# Patient Record
Sex: MALE | Race: WHITE | NOT HISPANIC OR LATINO
[De-identification: names, ages, dates, MRNs, and addresses within clinical notes are randomized per-mention and may not be internally consistent; named-entity substitution may affect disease eponyms.]

---

## 2021-04-01 ENCOUNTER — TRANSCRIBE ORDERS (OUTPATIENT)
Dept: OTHER | Age: 76
End: 2021-04-01

## 2021-04-01 DIAGNOSIS — S06.0X9D CONCUSSION WITH LOSS OF CONSCIOUSNESS, SUBSEQUENT ENCOUNTER: Primary | ICD-10-CM

## 2021-04-19 ENCOUNTER — HOSPITAL ENCOUNTER (OUTPATIENT)
Dept: NEUROLOGY | Facility: CLINIC | Age: 76
Setting detail: THERAPIES SERIES
Discharge: STILL ADMITTED OR ADMINISTRATIVE | End: 2021-04-19
Attending: NURSE PRACTITIONER

## 2021-04-19 DIAGNOSIS — F07.81 POST CONCUSSION SYNDROME: ICD-10-CM

## 2021-04-19 DIAGNOSIS — S06.9XAS MOOD DISORDER AS LATE EFFECT OF TRAUMATIC BRAIN INJURY (H): ICD-10-CM

## 2021-04-19 DIAGNOSIS — F90.9 ATTENTION DEFICIT HYPERACTIVITY DISORDER (ADHD), UNSPECIFIED ADHD TYPE: ICD-10-CM

## 2021-04-19 DIAGNOSIS — F06.30 MOOD DISORDER AS LATE EFFECT OF TRAUMATIC BRAIN INJURY (H): ICD-10-CM

## 2021-04-19 DIAGNOSIS — G47.00 INSOMNIA, UNSPECIFIED TYPE: ICD-10-CM

## 2021-04-19 DIAGNOSIS — G44.309 POST-CONCUSSION HEADACHE: ICD-10-CM

## 2021-04-19 RX ORDER — LOSARTAN POTASSIUM 25 MG/1
25 TABLET ORAL
Status: SHIPPED | COMMUNITY
Start: 2021-03-31

## 2021-04-19 RX ORDER — ERGOCALCIFEROL 1.25 MG/1
50000 CAPSULE ORAL
Status: SHIPPED | COMMUNITY
Start: 2019-10-21

## 2021-04-19 RX ORDER — ISOSORBIDE MONONITRATE 30 MG/1
30 TABLET, EXTENDED RELEASE ORAL
Status: SHIPPED | COMMUNITY
Start: 2021-04-16

## 2021-04-19 RX ORDER — CYANOCOBALAMIN 1000 UG/ML
1000 INJECTION, SOLUTION INTRAMUSCULAR; SUBCUTANEOUS
Status: SHIPPED | COMMUNITY
Start: 2020-01-01

## 2021-04-19 RX ORDER — ASPIRIN 81 MG/1
81 TABLET, CHEWABLE ORAL
Status: SHIPPED | COMMUNITY
Start: 2020-11-03

## 2021-04-19 RX ORDER — CLOPIDOGREL BISULFATE 75 MG/1
75 TABLET ORAL
Status: SHIPPED | COMMUNITY
Start: 2021-03-31

## 2021-04-19 RX ORDER — AMANTADINE HYDROCHLORIDE 100 MG/1
TABLET ORAL
Status: SHIPPED | COMMUNITY
Start: 2020-08-04

## 2021-04-19 RX ORDER — METOPROLOL TARTRATE 50 MG
50 TABLET ORAL
Status: SHIPPED | COMMUNITY
Start: 2021-03-31

## 2021-04-19 RX ORDER — LISINOPRIL 5 MG/1
5 TABLET ORAL
Status: SHIPPED | COMMUNITY
Start: 2021-04-19

## 2021-04-19 RX ORDER — PRAMIPEXOLE DIHYDROCHLORIDE 0.25 MG/1
0.5 TABLET ORAL
Status: SHIPPED | COMMUNITY
Start: 2021-03-31

## 2021-05-10 ENCOUNTER — OFFICE VISIT - HEALTHEAST (OUTPATIENT)
Dept: PHYSICAL THERAPY | Facility: REHABILITATION | Age: 76
End: 2021-05-10

## 2021-05-10 DIAGNOSIS — R53.82 CHRONIC FATIGUE: ICD-10-CM

## 2021-05-10 DIAGNOSIS — F07.81 POST CONCUSSION SYNDROME: ICD-10-CM

## 2021-05-10 DIAGNOSIS — R26.89 IMPAIRMENT OF BALANCE: ICD-10-CM

## 2021-05-10 DIAGNOSIS — R26.81 GAIT INSTABILITY: ICD-10-CM

## 2021-05-10 DIAGNOSIS — G44.309 POST-CONCUSSION HEADACHE: ICD-10-CM

## 2021-05-26 ENCOUNTER — RECORDS - HEALTHEAST (OUTPATIENT)
Dept: ADMINISTRATIVE | Facility: CLINIC | Age: 76
End: 2021-05-26

## 2021-05-27 ENCOUNTER — RECORDS - HEALTHEAST (OUTPATIENT)
Dept: ADMINISTRATIVE | Facility: CLINIC | Age: 76
End: 2021-05-27

## 2021-05-28 ENCOUNTER — COMMUNICATION - HEALTHEAST (OUTPATIENT)
Dept: PHYSICAL THERAPY | Facility: REHABILITATION | Age: 76
End: 2021-05-28

## 2021-05-28 ENCOUNTER — RECORDS - HEALTHEAST (OUTPATIENT)
Dept: ADMINISTRATIVE | Facility: CLINIC | Age: 76
End: 2021-05-28

## 2021-05-30 ENCOUNTER — RECORDS - HEALTHEAST (OUTPATIENT)
Dept: ADMINISTRATIVE | Facility: CLINIC | Age: 76
End: 2021-05-30

## 2021-06-01 ENCOUNTER — RECORDS - HEALTHEAST (OUTPATIENT)
Dept: ADMINISTRATIVE | Facility: CLINIC | Age: 76
End: 2021-06-01

## 2021-06-02 ENCOUNTER — OFFICE VISIT - HEALTHEAST (OUTPATIENT)
Dept: PHYSICAL THERAPY | Facility: REHABILITATION | Age: 76
End: 2021-06-02

## 2021-06-02 DIAGNOSIS — R26.89 IMPAIRMENT OF BALANCE: ICD-10-CM

## 2021-06-02 DIAGNOSIS — R26.81 GAIT INSTABILITY: ICD-10-CM

## 2021-06-02 DIAGNOSIS — R53.82 CHRONIC FATIGUE: ICD-10-CM

## 2021-06-03 ENCOUNTER — RECORDS - HEALTHEAST (OUTPATIENT)
Dept: ADMINISTRATIVE | Facility: CLINIC | Age: 76
End: 2021-06-03

## 2021-06-05 ENCOUNTER — RECORDS - HEALTHEAST (OUTPATIENT)
Dept: SCHEDULING | Facility: CLINIC | Age: 76
End: 2021-06-05

## 2021-06-05 DIAGNOSIS — R06.02 SHORTNESS OF BREATH: ICD-10-CM

## 2021-06-09 ENCOUNTER — RECORDS - HEALTHEAST (OUTPATIENT)
Dept: ADMINISTRATIVE | Facility: CLINIC | Age: 76
End: 2021-06-09

## 2021-06-14 ENCOUNTER — COMMUNICATION - HEALTHEAST (OUTPATIENT)
Dept: NEUROLOGY | Facility: CLINIC | Age: 76
End: 2021-06-14

## 2021-06-14 DIAGNOSIS — F06.30 MOOD DISORDER AS LATE EFFECT OF TRAUMATIC BRAIN INJURY (H): ICD-10-CM

## 2021-06-14 DIAGNOSIS — G47.00 INSOMNIA, UNSPECIFIED TYPE: ICD-10-CM

## 2021-06-14 DIAGNOSIS — S06.9XAS MOOD DISORDER AS LATE EFFECT OF TRAUMATIC BRAIN INJURY (H): ICD-10-CM

## 2021-06-14 DIAGNOSIS — F90.9 ATTENTION DEFICIT HYPERACTIVITY DISORDER (ADHD), UNSPECIFIED ADHD TYPE: ICD-10-CM

## 2021-06-14 RX ORDER — METHYLPHENIDATE HYDROCHLORIDE 5 MG/1
5 TABLET ORAL 2 TIMES DAILY
Qty: 60 TABLET | Refills: 0 | Status: SHIPPED | OUTPATIENT
Start: 2021-06-14

## 2021-06-16 ENCOUNTER — COMMUNICATION - HEALTHEAST (OUTPATIENT)
Dept: PHYSICAL THERAPY | Facility: REHABILITATION | Age: 76
End: 2021-06-16

## 2021-06-16 NOTE — PROGRESS NOTES
"Telephone Visit  Alvaro Thrasher is a 76 y.o. male who is being evaluated via a billable video visit in light of the ongoing global health crisis (COVID-19) that requires us to abide by social distancing mandates in order to reduce the risk of COVID-19 exposure.      The patient has been notified of following:     \"This telephone visit will be conducted via a call between you and your physician/provider. We have found that certain health care needs can be provided without the need for a physical exam.  This service lets us provide the care you need with a short phone conversation.  If a prescription is necessary we can send it directly to your pharmacy.  If lab work is needed we can place an order for that and you can then stop by our lab to have the test done at a later time.    If during the course of the call the physician/provider feels a telephone visit is not appropriate, you will not be charged for this service.\"     Patient has given verbal consent to a Telephone visit? Yes    Alvaro Thrasher chief complaint is: Post Concussion Syndrome    Consent was obtained for this service by one of our care team members    ALLERGIES  Patient has no known allergies.    Current PT  No      Current OT  No      Current ST  No       Current Chiropractic  No  Psychiatrist currently No  Past:  No  Psychologist currently No  Past:  Yes  Primary: Currently Yes                     MRI/CT Completed  Yes     Need a note for work accommodations  Yes    Need a note for school accommodations  No         Medications  Currently on medication to help you sleep  Yes   Mirapex   Mental health dx.- Depression   Currently on medication to help with mental health Yes     venlafaxine   Currently on medication for concentration or ADD /ADHD     No         Are you on a controlled substance  No     Date of accident: 2018  Workman's Comp   No     How concussion happened:     \"Onset symptoms fell backward and on right side of head from chair striking " "head on concrete wall, dazed, ~ an hr laid down on couch and when he got up he got dizzy and nauseates, next day vomited and has ongoing nausea and dizziness and headache since. Balance off.\"         LOC:  Yes    Did you seek medical attention:  Yes       MRI/CT Completed  Yes       Injury Description:               Was there a forcible blow to the head?:                Yes     Where on head? Right side of head                                             Retrograde Amnesia (loss of memory of events before the injury)?:  No  Anterograde Amnesia (loss of memory of events following injury)?:  Yes    Number of previous head injuries.        0    Work/School  Currently employed     Yes    Are you considered a essential employee?     Yes  Are you working from home or in office office   Retired    No     Disability  No   Title   Owner of a [employer]         Normal hours per week  (Average before injury) 30        Have your returned to work?            Yes    Full hours:     Yes    Hours working a week currently  30  Any days off after concussion?                                Yes    He denies any developmental problems, learning disabilities, or history of ADHD.     Patient History  Patient was referred to the concussion clinic by Lucía Ayon [facility] Dr. Dale Lentz.     Phone Start Time: 10:50 am    Phone End Time:  11:05 am    Total time for phone call 15 minutes     Phone number Patient would like to use: [phone]    [facility]       Plan:     Neuropsychological assessment   No   PT to evaluate and treat  Yes  OT to evaluate and treat  No  ST to evaluate and treat  No  Referral to ophthalmology   No  Referral to Neurology        No  Referral to psychology No  Referral to psychiatry  No  Other Referral   No  MRI/CT ordered today : No  Labs ordered today : No  New medication :  Yes  Amitriptyline and Wellbutrin  Work note completed : No  School note completed : No  [facility] list " "sent : No    Discussed: RED FLAGS NEEDING ACUTE EMERGENCY MANAGEMENT:                          Headaches that worsen                          Seizures                          Focal Neurologic Signs                          Drowsiness/Lethargy                          Repeated vomiting                          Slurred Speech                          Inability to recognize people/places                          Increasing confusion/irritability                          Weakness/numbness                          Neck pain                          Unusual behavioral change                          Change in level of consciousness    Subjective:          HPI    Per medical records..\"Onset symptoms fell backward and on right side of head from chair striking head on concrete wall, dazed, ~ an hr laid down on couch and when he got up he got dizzy and nauseates, next day vomited and has ongoing nausea and dizziness and headache since. Balance off.\"  .  Headaches:  Significant ongoing headaches Yes  Headaches: Intermittently  Improvement :Yes   Current Headache Yes   Wake with HA  Yes     Worse Headache    9/10           How often: once every couple of weeks    Average Headache 7/10.    Best Headache 2/10.  Brings on HA:   He is not sure  Makes symptoms worse  fatigue  Makes symptoms better. rest  Taking  acetaminophen (Tylenol) and aspirin        Helpful:  Yes     Physical Symptoms:  Headache-Yes      Resolved No           Improved since accident Improved     Nausea- Yes      Resolved No        Improved since accident    Improved     Vomiting - Yes       Resolved No         Improved since accident Improved     Balance problems - Yes       Resolved No Improved since accident Same     Dizziness - Yes      Resolved No          Improved since accident Same   Visual problems - Yes,  double      Resolved Yes            Fatigue - Yes     Resolved No           Improved since accident Same    Sensitivity to light - Yes     Resolved " No         Improved since accident Same    Sensitivity to sound - Yes      Resolved No        Improved since accident Same    Numbness/tingling - No        Cognitive Symptoms  Feeling mentally foggy - Yes         Resolved No       Improved since accident Same    Feeling slowed down - Yes         Resolved No         Improved since accident Same    Difficulty Concentrating- Yes        Resolved   No     Improved since accident Same    Difficulty remembering - Yes         Resolved No       Improved since accident Same      Emotional Symptoms  Irritability - Yes        Resolved No         Improved since accident Same    Sadness-   Yes       Resolved No        Improved since accident Same    More emotional - Yes       Resolved No       Improved since accident Same    Nervousness/anxiety - Yes       Resolved No        Improved since accident Same      Psychiatric History:  Anxiety - No  Depression - Yes  Other mental health dx:  No    Sleep Disorders - Yes, FRAN, he is compliant with CPAP  The patient denies being a victim of abuse.    Ever Hospitalized for mental health:             No  Any thought of hurting self or others now?   No  Any history of hurting self or others?            No    Sleep History:  Drowsiness- Yes         Resolved No        Improved since accident Same    Sleep less than usual - Yes  Sleep more than usual - No  Trouble falling asleep - Yes       Resolved No        Improved since accident Same    Does the patient wake feeling rested - No       Resolved No         Improved since accident Same      Migraine Headaches      Patient history of migraines.    No      Family history of migraines    No    Exertion:         Do the above stated symptoms worsen with physical activity? Yes        Do the above stated symptoms worsen with cognitive activity? No          Patient Active Problem List    Diagnosis Date Noted     Benign Prostatic Hypertrophy      Seeing Double (Diplopia)      Hyperlipidemia       Abducens Nerve Injury      Essential Hypertension      Coronary Artery Disease      Type 2 Diabetes Mellitus - Uncomplicated, Controlled      Back Strain      Pelvic Pain      No past medical history on file.  No past surgical history on file.  No family history on file.  Current Outpatient Medications   Medication Sig Dispense Refill     atorvastatin (LIPITOR) 40 MG tablet TAKE 1 TABLET BY MOUTH EVERY DAY 90 tablet 0     glipiZIDE (GLUCOTROL) 5 MG tablet Take 1 tablet (5 mg total) by mouth 2 (two) times a day. 180 tablet 0     metFORMIN (GLUCOPHAGE) 500 MG tablet TAKE 2 TABLETS BY MOUTH EVERY MORNING AND EVERY EVENING 360 tablet 0     NITROSTAT 0.4 mg SL tablet DISSOLVE 1 TABLET UNDER THE TONGUE EVERY 5 MINUTES IF NEEDED FOR CHEST PAIN(FIRST CHOICE FOR CHEST PAIN) 25 tablet 0     venlafaxine (EFFEXOR-XR) 75 MG 24 hr capsule TAKE 1 CAPSULE BY MOUTH TWICE DAILY 60 capsule 0     venlafaxine (EFFEXOR-XR) 75 MG 24 hr capsule TAKE ONE CAPSULE BY MOUTH TWICE DAILY 60 capsule 0     No current facility-administered medications for this encounter.        Social History     Socioeconomic History     Marital status:      Spouse name: Not on file     Number of children: Not on file     Years of education: Not on file     Highest education level: Not on file   Occupational History     Not on file   Social Needs     Financial resource strain: Not on file     Food insecurity     Worry: Not on file     Inability: Not on file     Transportation needs     Medical: Not on file     Non-medical: Not on file   Tobacco Use     Smoking status: Not on file   Substance and Sexual Activity     Alcohol use: Not on file     Drug use: Not on file     Sexual activity: Not on file   Lifestyle     Physical activity     Days per week: Not on file     Minutes per session: Not on file     Stress: Not on file   Relationships     Social connections     Talks on phone: Not on file     Gets together: Not on file     Attends [employer] service: Not  on file     Active member of club or organization: Not on file     Attends meetings of clubs or organizations: Not on file     Relationship status: Not on file     Intimate partner violence     Fear of current or ex partner: Not on file     Emotionally abused: Not on file     Physically abused: Not on file     Forced sexual activity: Not on file   Other Topics Concern     Not on file   Social History Narrative     Not on file       The following portions of the patient's history were reviewed and updated as appropriate: allergies, current medications, past family history, past medical history, past social history, past surgical history and problem list.    Review of Systems  A comprehensive review of systems was negative except for what is noted above.    Objective:       Discussion was held with the patient today regarding concussion in general including types of injury, symptoms that are common, treatment and variability in time to recover. Education about concussion symptoms and length of time it would take the patient to recover was also given to the patient.  I have reassured the patient his symptoms are very common when a concussion is present and will improve with time. We discussed the risks and benefits of possible medication including risk of worsening depression with medication adjustments and even the possibility of emergence of suicidal ideations.       Total time spent with the patient today was 90 minutes with greater than 50% of the time spent in counseling and care coordination. The patient agrees to call with any questions, concerns or problems. We will assess for the appropriateness of possible psychotropic medication trials/changes. The patient will seek out appropriate emergency services should that become necessary    Physical Exam:   Neck:  Full ROM  Yes with pain or stiffness Yes    Neurologic:   Mental status: Alert, oriented, thought content appropriate.. Recent and remote memory grossly  intact.  Yes  Speech is clear and fluent with no obvious word finding or paraphasic errors. Yes    Diagnosis managed and treated at today's visit :  Post concussion syndrome  Post concussion headache  Nausea  Dizziness  Fatigue  Insomnia  Sensitivity to light  Sound sensitivity  Concentration and Attention deficit  Memory difficulties  Anxiety d/t a medical condition  Irritability  Return to work     Plan:  Medication Adjustment:  Amitriptyline  25 mg, take 1-2 tabs PO every HS  Ritalin 5 mg, take one tab PO two times a day    Other:   Patient will return to clinic in 3 weeks. They agree to call or return sooner with any questions or concerns.  Risks and benefits were discussed.  Continue with individual therapist if established     Continue with the support of the clinic, reassurance, and redirection. Staff monitoring and ongoing assessments per team plan.. This team will utilize appropriate emergency services if necessary. I will make myself available if concerns or problems arise.     Mental Status Examination    He is cooperative with questioning. He is fully engaged in conversation today. He is alert and fully oriented. Speech is normal. Thought processes normal with normal prehension and expression. Thoughts are organized and linear. Content is pertinent to the conversation and without evidence of auditory or visual hallucinations. No delusional ideation. Gen. fund of knowledge, insight and memory are normal     Consent was obtained for this service by one of our care team members    Telephone Visit Details    Type of service: Telephone Visit    Phone Start Time: 1100    Phone End Time:  1220    Total time for phone call: 80    Originating Location: Patient's home    Distant Location:  [facility]    Mode of Communication: Telephone call        10 minutes spent on the date of the encounter doing chart review and review of outside records     Patient Instructions   It was nice  "speaking with you today for our office visit held over the phone The following is a summary of our visit and my recommendations:    How to return to daily activities with concussion:  1. Get lots of rest. Be sure to get enough sleep at night- no late nights. Keep the same bedtime weekdays and weekends.   2. Take daytime naps or rest breaks when you feel tired or fatigued.  3. Limit physical activity as well as activities that require a lot of thinking or concentration. These activities can make symptoms worse and recovery time longer. In some cases, your doctor may prescribe time that you completely eliminate these activities to allow complete \"brain rest.\"  Physical activity includes going to the gym, sports practices, weight-training, running, exercising, heavy lifting, etc.  Thinking and concentration activities (e.g., cell phone texting, computer games, movies, parties, loud music and in severe cases may include limiting your time at work).  4. Drink lots of fluids and eat carbohydrates or protein to main appropriate blood sugar levels.  5. As symptoms decrease, with consent from your doctor, you may begin to gradually return to your daily activities. If symptoms worsen or return, lessen your activities, then try again to increase your activities gradually.   6. During recovery, it is normal to feel frustrated and sad when you do not feel right and you can t be as active as usual.  7. Repeated evaluation of your symptoms is recommended to help guide recovery. Please follow up as recommended by your doctor to ensure a safe and healthy recovery.    Watch for and go to the Emergency Department if you have any of the following symptoms:  Headaches that significantly worsen  Looks very drowsy or can t be awakened  Can t recognize people or places  Worsening neck pain  Seizures  Repeated vomiting  Increasing confusion or irritability  Unusual behavioral change  Slurred speech  Weakness or numbness in " arms/legs  Change in state of consciousness    For more information, please visit on the Internet:  http://www.cdc.gov/concussion/get_help.html   http://www.cdc.gov/concussion/pdf/Facts_about_Concussion_TBI-a.pdf    General Information:  Today you had your appointment with Nimco Don CNP     If lab work was done today as part of your evaluation you will generally be contacted via My Chart, mail, or phone with the results within 1-5 days. If there is an alarming result we will contact you by phone. Lab results come back at varying times, I generally wait until all labs are resulted before making comments on results. Please note labs are automatically released to My Chart once available.     If you need refills please contact your pharmacist. They will send a refill request to me to review. Please allow 3 business days for us to process all refill requests.     Please call or send a medical message through My Chart, with any questions or concerns    If you need any paperwork completed please fax forms to [phone]. Please state if you would like a copy of the completed paperwork, mailed or faxed back to the patient and a fax number to fax the paperwork to. Please allow up to 10 days for paperwork to be completed.    Nimco Don CNP

## 2021-06-17 NOTE — PROGRESS NOTES
[facility]   Concussion Initial Evaluation    Patient Name: Alvaro Thrasher  Date of evaluation: 5/11/2021  Referral Diagnosis:s/p Concussion  Referring provider: Nimco Don FNP  Visit Diagnosis:     ICD-10-CM    1. Impairment of balance  R26.89    2. Post concussion syndrome  F07.81 PT eval and treat   3. Post-concussion headache  G44.309 PT eval and treat   4. Gait instability  R26.81    5. Chronic fatigue  R53.82        Assessment:   Alvaro Thrasher is a 76 y.o. male who presents to therapy today with chief complaints of poor fatigue and worsening gait stability/balance. The pt's symptoms began after he sustained a concussion in May 2018 and while he initially had physical therapy that he feels improved his symptoms, he feels he has gone backwards.  The pt reports minimal stamina and an increase in falls, making walking for exercise difficult.  The PT eval revealed balance deficits and the vestibular system was cleared.  General LE weakness and poor activity tolerance appear to be contributing to his symptoms and a strengthening and conditioning program would benefit the pt.  The pt is appropriate for skilled 1:1 PT to address his physical and functional impairments.       Goals:  Pt. will demonstrate/verbalize independence in self-management of condition in : 12 weeks  Pt. will be independent with home exercise program in : 12 weeks  Pt. will be able to walk : 30 minutes;with less difficulty;for community mobility;for exercise/recreation;in 12 weeks (to demo improved fatigue)    Pt will: score 23/30 on the FGA to demo improved gait stability and be a lesser fall risk in 12 weeks      Patient's expectations/goals are realistic.    Barriers to Learning or Achieving Goals:  Chronicity of the problem.  Mental illness or emotional factors.  Pt admits to depression, anxiety       Plan / Patient Instructions:        Plan of Care:   Authorization / Certification Start Date: 05/10/21  Authorization  "/ Certification End Date: 08/08/21  Authorization / Certification Number of Visits: 12  Communication with: Referral Source  Patient Related Instruction: Nature of Condition;Treatment plan and rationale;Self Care instruction;Basis of treatment;Body mechanics;Posture;Precautions;Next steps;Expected outcome  Times per Week: 1-2  Number of Weeks: 6-12  Number of Visits: 12  Discharge Planning: When pt meets PT goals or when pt progress plateaus or when pt is independent with his HEP for ongoing symptom management  Precautions / Restrictions : None  Therapeutic Exercise: ROM;Stretching;Strengthening  Neuromuscular Reeducation: posture;balance/proprioception;core;vestibular  Manual Therapy: soft tissue mobilization;myofascial release;joint mobilization;muscle energy  Gait Training: GT      Plan for next visit: Complete FGA      Subjective:       Onset Date:  May 23, 2018    Mechanism of Injury:  Pt confirms the following.  He also notes he was pushing back to get up from his chair and it come off of a drop off and the pt fell backwards striking his head. Pt did have a bout of Physical Therapy after his injury for his balance which he found to be very helpful.    From EMR (4/19/21): \"Onset symptoms fell backward and on right side of head from chair striking head on concrete wall, dazed, ~ an hr laid down on couch and when he got up he got dizzy and nauseates, next day vomited and has ongoing nausea and dizziness and headache since. 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  Pt \"works, comes home, and goes to bed\".  Pt is totally exhausted upon getting home.    Living situation:  Lives with his wife, Reed (who is present at [facility]).  Pt and spouse just moved to a 3 level condo.  There's about 7 steps from garage to the main level and then bed room is another level up.  Pt reports that " "stairs are tiring for him.    Ongoing symptoms:  \"I don't have my balance\" and fatigue.      Headaches: No complaints        Neck Pain: No complaints     Dizziness:   -Frequency:  Intermittent, but daily   -Description of dizziness without using the word \"dizzy\":  \"Like it's a wave that comes over me\"   -Exacerbating factors:  Bending, lifting items out of the back of his car   -Relieving factors:  Rest   -Dizziness history:  None prior to his concussion in 2018   -Current:  0/10, Best:  0/10, Worst:  10/10    Balance:   -When do you lose balance?:  Bending over, walking on uneven terrain, stepping onto a curb (has fallen due to not lifting foot high enough)   -Recent falls:  Pt feels like he falls like almost 1-2x/wk.   -Does not use a SEC or FWW (but does own both).  Pt feels like he has unsteady gait         Objective:      Note: Items left blank indicates the item was not performed or not indicated at the time of the evaluation.    Patient Outcome Measures :    None completed at PT eval    Cervical Thoracic Examination  1. Impairment of balance     2. Post concussion syndrome  PT eval and treat   3. Post-concussion headache  PT eval and treat   4. Gait instability     5. Chronic fatigue       Precautions/Restrictions: None  Involved side: Bilateral    Cervical ROM: Limited with extension and B sidebend consistent with degenerative changes.  The pt denies any pain but notes general stiffness.  Vertebral Basilar Artery: WNL  Cervical and Thoracic Segmental Mobility/Other: NT     Postural Assessment: Slumped, forward head     Vestibular/Balance    Gait: Unsteady with WBOS, reduced B heel strike, reduced stride length    B LE Screen: MMT is grossly 4/5 to 4-/5, B'ly     Oculomotor  Ocular AROM: WNL  Smooth Pursuit: WNL  Saccades: WNL  Convergence: 9 cm     Positional Tests: Patient educated on positional testing technique and purpose of recreating symptoms - patient consented. Vestibular semi-circular canal " assessment withOUT goggles:   R Julianne-Hallpike: Negative; no c/o dizziness  L Julianne--Hallpike: Negative; no c/o dizziness    Vestibular  VOR Cancellation: Normal  Slow VOR: Normal   Right Head Impulse Test: No c/o dizziness    Left Head Impulse Test: No c/o dizziness     Sensory Organization Tests:  MCTSIB:   NSEO 30 sec, mild sway  NSEC 30 sec, mod-max sway  PSEO 30 sec, max sway but no LOB  PSEC 7.5 sec     Functional Gait Assessment:  NT due to time constraints      Treatment Today:  TREATMENT MINUTES COMMENTS   Evaluation 30 Low Complexity Eval  Patient educated on pathology  Discussed POC   Self-care/ Home management 15   Education Concussion: Patient was educated on the following:  - self-management of symptoms, the importance of relative rest  (rest as needed) instead of strict rest, the benefits of progressive re-engagement in activities, the importance of sleep, safe  - [id number] pacing strategies, and potential signs and symptoms of the need for follow-up care with a physician, physical therapist, or other health care providers.  - about the various symptoms, impairments, and functional limitations that are associated with concussion, and stress that most patients with concussionrecover relatively quickly.   - most people recover well and typically do not have significant difficulties that last more than 1 to 3 months post injury     Manual therapy     Neuromuscular Re-education 10 Standing balance in corner:  -Romberg position with EO and EC  -Semi Tandem position with EO, progressively reducing space between feet; progressing towards EC   Therapeutic Activity     Therapeutic Exercises  Demo/performance of HEP, education on purpose of exercise. Handouts with written instruction provided.   Gait training     Modality__________________                Total 55    Blank areas are intentional and mean the treatment did not include these items.         PT Evaluation Code: (Please list factors)  Patient  History/Comorbidities: see PMH.  Examination: see above, 4+ elements  Clinical Presentation: stable  Clinical Decision Making: low    Patient History/  Comorbidities Examination  (body structures and functions, activity limitations, and/or participation restrictions) Clinical Presentation Clinical Decision Making (Complexity)   No documented Comorbidities or personal factors 1-2 Elements Stable and/or uncomplicated Low   1-2 documented comorbidities or personal factor 3 Elements Evolving clinical presentation with changing characteristics Moderate   3-4 documented comorbidities or personal factors 4 or more Unstable and unpredictable High            Katelyn Vaughan, PT, DPT  5/11/2021  3:10 PM

## 2021-06-25 NOTE — TELEPHONE ENCOUNTER
Called and left message for patient regarding missed appointment on 6/16 at 10am. Reminded of no show policy and that this is NS #2. Reminded of upcoming appointment.    Kaylah Ramon, PT, DPT

## 2021-06-25 NOTE — TELEPHONE ENCOUNTER
Called and spoke to patient regarding missed appointment on 5/28 at 9:30 am. Patient reports that he forgot to write it down, but also that he is not feeling good today. Patient reports chest pain. Patient was instructed of red flags with chest pain and when to go in if needed. Patient also reminded of his upcoming appointments.    Kaylah Chavez, PT, DPT

## 2021-06-25 NOTE — PROGRESS NOTES
[facility] Daily Progress Note    Patient Name: Alvaro Thrasher  Date: 6/2/2021  Visit #: 2/12  PTA visit #:  NA  Referral Diagnosis: Post concussion syndrome  Referring provider: Nimco Don FNP  Visit Diagnosis:     ICD-10-CM    1. Chronic fatigue  R53.82    2. Gait instability  R26.81    3. Impairment of balance  R26.89        Assessment from Initial Evaluation:  Alvaro Thrasher is a 76 y.o. male who presents to therapy today with chief complaints of poor fatigue and worsening gait stability/balance. The pt's symptoms began after he sustained a concussion in May 2018 and while he initially had physical therapy that he feels improved his symptoms, he feels he has gone backwards.  The pt reports minimal stamina and an increase in falls, making walking for exercise difficult.  The PT eval revealed balance deficits and the vestibular system was cleared.  General LE weakness and poor activity tolerance appear to be contributing to his symptoms and a strengthening and conditioning program would benefit the pt.  The pt is appropriate for skilled 1:1 PT to address his physical and functional impairments.     Precautions / Restrictions : None      Assessment:   Pt returns to PT for his initial subsequent treatment session.  The pt was issued the **'ed exercises below to his written HEP.  The pt was well challenged with the exercises and needed rest breaks due to fatigue.  Plan to add gait drills to challenge balance as well as group exercises to perform circuit training to progress pt's activity tolerance.     HEP/POC compliance is  fair .    Goal Status:  Pt. will demonstrate/verbalize independence in self-management of condition in : 12 weeks  Pt. will be independent with home exercise program in : 12 weeks  Pt. will be able to walk : 30 minutes;with less difficulty;for community mobility;for exercise/recreation;in 12 weeks (to demo improved fatigue)    Pt will: score 23/30 on the FGA to demo  "improved gait stability and be a lesser fall risk in 12 weeks      Plan / Patient Education:   Add gait drills next Rx session    Continue with initial plan of care.  Progress with home program as tolerated.    Subjective:     Pain Rating: Pt denies any pain.    Pt feels like \"I don't have any stamina\" and that his balance remains poor.  Pt reports he isn't doing much at home in terms of exercise.    Objective:     Therapeutic Exercise    For aerobic conditioning and to address axial rigidity for greater core mm engagement to assist with balance:  -NuStep x 8 min, level #5 , avg METs: 2.9 , SPM: 68   -PT assists with set up and cues to keep SPM > 60; pt felt this was challenging    For core and LE strengthening to assist with balance, activity tolerance and general conditioning:  -**Standing mini squats with light UE support 2 x 10 reps.  Cues for posterior wt shift.  -**Standing heel raises 2 x 10 reps with light UE support.  Cues to push straight up through toes vs leaning forward.    -**Tall marching with light single UE support x 20 reps ea LE and 3 sec count.  Pt was challenged with his balance; cues to engage glutes and quads to assist with stability  -**Supine bridging x 20 reps without UE support; cues to engage glutes   -**SL clams x 20 reps ea side with cues to avoid posterior pelvic rotation.          Treatment Today     TREATMENT MINUTES COMMENTS   Evaluation     Self-care/ Home management     Manual therapy     Neuromuscular Re-education     Therapeutic Activity     Therapeutic Exercises 41 See above   Gait training     Modality__________________                Total 41 **Pis 15 min late for PT session**   Blank areas are intentional and mean the treatment did not include these items.       Katelyn Vaughan, PT, DPT  6/2/2021      "

## 2021-06-29 ENCOUNTER — OFFICE VISIT - HEALTHEAST (OUTPATIENT)
Dept: PHYSICAL THERAPY | Facility: REHABILITATION | Age: 76
End: 2021-06-29

## 2021-06-29 DIAGNOSIS — F07.81 POST CONCUSSION SYNDROME: ICD-10-CM

## 2021-06-29 DIAGNOSIS — R26.81 GAIT INSTABILITY: ICD-10-CM

## 2021-06-29 DIAGNOSIS — R26.89 IMPAIRMENT OF BALANCE: ICD-10-CM

## 2021-06-29 DIAGNOSIS — R53.82 CHRONIC FATIGUE: ICD-10-CM

## 2021-06-29 DIAGNOSIS — G44.309 POST-CONCUSSION HEADACHE: ICD-10-CM

## 2021-07-06 ENCOUNTER — COMMUNICATION - HEALTHEAST (OUTPATIENT)
Dept: PHYSICAL THERAPY | Facility: REHABILITATION | Age: 76
End: 2021-07-06

## 2021-07-07 NOTE — PROGRESS NOTES
[facility] Daily Progress Note    Patient Name: Alvaro Thrasher  Date: 6/29/2021  Visit #: 4/12  PTA visit #:  NA  Referral Diagnosis: Post concussion syndrome  Referring provider: Nimco Don FNP  Visit Diagnosis:     ICD-10-CM    1. Chronic fatigue  R53.82    2. Gait instability  R26.81    3. Impairment of balance  R26.89    4. Post concussion syndrome  F07.81    5. Post-concussion headache  G44.309        Assessment from Initial Evaluation:  Alvaro Thrasher is a 76 y.o. male who presents to therapy today with chief complaints of poor fatigue and worsening gait stability/balance. The pt's symptoms began after he sustained a concussion in May 2018 and while he initially had physical therapy that he feels improved his symptoms, he feels he has gone backwards.  The pt reports minimal stamina and an increase in falls, making walking for exercise difficult.  The PT eval revealed balance deficits and the vestibular system was cleared.  General LE weakness and poor activity tolerance appear to be contributing to his symptoms and a strengthening and conditioning program would benefit the pt.  The pt is appropriate for skilled 1:1 PT to address his physical and functional impairments.     Precautions / Restrictions : None      Assessment:   Pt returns for follow up PT session. Patient states that he has improved motivation to participate and that he needs to due to frequent falls. Improvement in compliance to HEP. Focused on balance and functional strengthening with addition of balance exercise on this date to HEP.   The pt remains well challenged with the exercises and needed rest breaks due to fatigue.   Plan to add gait drills to challenge balance.  Pt does not seem interested in more therapy.  Discuss possible discharge to HEP given pt's low motivation and desire to not continue PT.    HEP/POC compliance is  fair .    Goal Status:  Pt. will demonstrate/verbalize independence in self-management of  condition in : 12 weeks  Pt. will be independent with home exercise program in : 12 weeks  Pt. will be able to walk : 30 minutes;with less difficulty;for community mobility;for exercise/recreation;in 12 weeks (to demo improved fatigue)    Pt will: score 23/30 on the FGA to demo improved gait stability and be a lesser fall risk in 12 weeks      Plan / Patient Education:   Progress HEP, gait drills    Assignment for next time: keep track of falls.    Continue with initial plan of care.  Progress with home program as tolerated.    Subjective:     Pain Rating: Pt denies any pain right now    Pt reports no significant change with PT.  Pt reports poor HEP.  Patient reports headaches and pain in back and shoulders.     Patient reports stairs are bad, going down. Misses his walking he used to do. Hard to workout when start to feel lightheaded or dizzy.    He reports that he has been falling 2-3 times per day.     Objective:     Therapeutic Exercise    For aerobic conditioning and to address axial rigidity for greater core mm engagement to assist with balance:  -NuStep x 8 min, level #5 , avg METs: 2.9 , SPM: 68   -PT assists with set up and cues to keep SPM > 60; pt felt this was challenging; required verbal encouragement to continue    For core and LE strengthening to assist with balance, activity tolerance and general conditioning:  -Standing mini squats with light UE support 2 x 10 reps.  Cues for posterior wt shift.  -Standing heel raises 2 x 10 reps with light UE support.  Cues to push straight up through toes vs leaning forward.    --Supine bridging x 20 reps without UE support; cues to engage glutes  (HEP but not today)  -SL clams x 20 reps ea side with cues to avoid posterior pelvic rotation.  (HEP but not today)    Neuromuscular Re-education    Balance exercise:   - Tandem balance 2x30 sec bilaterally semi tandem adjusted   - NBOS eyes closed x30sec x2  - NBOS on foam x30 sec  - NBOS on foam eyes closed [id number]    -Tall marching with light single UE support x 20 reps ea LE and 3 sec count.  Pt was challenged with his balance; cues to engage glutes and quads to assist with stability;   -Added marching on foam, 2x 10 reps with single UE support.  -Hip ABDuction while on foam x 10 reps ea side  - Stepping up and over foam side stepping x10 each side x2 sets  - Forward step ups on blue foam x10 each side x2 sets            Treatment Today     TREATMENT MINUTES COMMENTS   Evaluation     Self-care/ Home management     Manual therapy     Neuromuscular Re-education 29 See above   Therapeutic Activity     Therapeutic Exercises 24 See above   Gait training     Modality__________________                Total 53 Patient presented 9 min late   Blank areas are intentional and mean the treatment did not include these items.         Kaylah Chavez, PT, DPT  6/29/2021

## 2021-07-26 NOTE — PROGRESS NOTES
Progress Notes by Kaylah Chavez PT at 6/29/2021  9:30 AM     Author: Kaylah Chavez PT Service: -- Author Type: Physical Therapist    Filed: 7/26/2021  2:42 PM Encounter Date: 6/29/2021 Status: Signed    : Kaylah Chavez PT (Physical Therapist)       [facility] Discharge Summary  Patient Name: Alvaro Thrasher  Date: 7/26/2021  Referral Diagnosis: Post concussion syndrome  Referring provider: Nimco Don FNP  Visit Diagnosis:     ICD-10-CM    1. Chronic fatigue  R53.82    2. Gait instability  R26.81    3. Impairment of balance  R26.89    4. Post concussion syndrome  F07.81    5. Post-concussion headache  G44.309        Goals:  Pt. will demonstrate/verbalize independence in self-management of condition in : 12 weeks  Pt. will be independent with home exercise program in : 12 weeks  Pt. will be able to walk : 30 minutes;with less difficulty;for community mobility;for exercise/recreation;in 12 weeks (to demo improved fatigue)    Pt will: score 23/30 on the FGA to demo improved gait stability and be a lesser fall risk in 12 weeks      Patient was seen for four visits from 5/10 to 6/29 with four missed appointments.  The patient attended therapy initially, but did not finish the therapy sessions prescribed.  Goals were not fully achieved. Explanation for goals not achieved: .  The patient has not shown for their scheduled appointment(s) and has not called ot reschedule.  Goals were not met.  Three attempts to contact the patient were made, however the patient has not responded to our attempts.    Home exercise program given to patient.  Therapy will be discontinued at this time.  The patient will need a new referral to resume.    Thank you for your referral.  Kaylah Chavez PT, DPT  7/26/2021  2:38 PM

## 2021-11-08 DIAGNOSIS — F06.30 MOOD DISORDER AS LATE EFFECT OF TRAUMATIC BRAIN INJURY (H): ICD-10-CM

## 2021-11-08 DIAGNOSIS — S06.9XAS MOOD DISORDER AS LATE EFFECT OF TRAUMATIC BRAIN INJURY (H): ICD-10-CM

## 2021-11-08 DIAGNOSIS — G47.00 INSOMNIA, UNSPECIFIED TYPE: ICD-10-CM

## 2021-11-08 NOTE — TELEPHONE ENCOUNTER
Refill request for amitriptylin 25mg  Message sent to  to schedule f/u with Nimco MAHER'minesh for review and signature  Kofi HERRERA ATC

## 2021-11-29 DIAGNOSIS — G47.00 INSOMNIA, UNSPECIFIED TYPE: ICD-10-CM

## 2021-11-29 DIAGNOSIS — S06.9XAS MOOD DISORDER AS LATE EFFECT OF TRAUMATIC BRAIN INJURY (H): ICD-10-CM

## 2021-11-29 DIAGNOSIS — F06.30 MOOD DISORDER AS LATE EFFECT OF TRAUMATIC BRAIN INJURY (H): ICD-10-CM

## 2021-11-29 NOTE — TELEPHONE ENCOUNTER
Refill request for amitriptyline (ELAVIL) 25 MG tablet. Last refill date was 11/8/2021.     Pt's next appt is scheduled on 12/29/2021.    Medication T'd for review and signature    LEELEE Crowe on 11/29/2021 at 8:30 AM

## 2024-09-19 ENCOUNTER — TELEPHONE (OUTPATIENT)
Dept: INFUSION THERAPY | Facility: CLINIC | Age: 79
End: 2024-09-19
Payer: COMMERCIAL